# Patient Record
(demographics unavailable — no encounter records)

---

## 2025-07-01 NOTE — DISCUSSION/SUMMARY
[Normal Growth] : growth [Normal Development] : development  [No Elimination Concerns] : elimination [Continue Regimen] : feeding [No Skin Concerns] : skin [Normal Sleep Pattern] : sleep [None] : no medical problems [Anticipatory Guidance Given] : Anticipatory guidance addressed as per the history of present illness section [School] : school [Development and Mental Health] : development and mental health [Nutrition and Physical Activity] : nutrition and physical activity [Oral Health] : oral health [Safety] : safety [No Vaccines] : no vaccines needed [No Medications] : ~He/She~ is not on any medications [Patient] : patient [Mother] : mother [Full Activity without restrictions including Physical Education & Athletics] : Full Activity without restrictions including Physical Education & Athletics [I have examined the above-named student and completed the preparticipation physical evaluation. The athlete does not present apparent clinical contraindications to practice and participate in sport(s) as outlined above. A copy of the physical exam is on r] : I have examined the above-named student and completed the preparticipation physical evaluation. The athlete does not present apparent clinical contraindications to practice and participate in sport(s) as outlined above. A copy of the physical exam is on record in my office and can be made available to the school at the request of the parents. If conditions arise after the athlete has been cleared for participation, the physician may rescind the clearance until the problem is resolved and the potential consequences are completely explained to the athlete (and parents/guardians). [FreeTextEntry1] : Balanced nutrition, multivitamin supplementation and daily exercise emphasized. Annual PE/Dental/Eye. Advised to learn to eat more veggies and cut back on sweets/carbs

## 2025-07-01 NOTE — HISTORY OF PRESENT ILLNESS
[Mother] : mother [Fruit] : fruit [Meat] : meat [Grains] : grains [Eggs] : eggs [Fish] : fish [Dairy] : dairy [Normal] : Normal [Brushing teeth twice/d] : brushing teeth twice per day [Flossing teeth] : flossing teeth [Yes] : Patient goes to dentist yearly [Tap water] : Primary Fluoride Source: Tap water [Has Friends] : has friends [Grade ___] : Grade [unfilled] [No] : No cigarette smoke exposure [Up to date] : Up to date [NO] : No [FreeTextEntry7] : Healthy [de-identified] : none [de-identified] : vegetables are scarce; drinks Lactaid milk [FreeTextEntry1] : Well first visit for this 10 year old who is healthy. NKA. He eats well but almost no vegetables. He drinks water and milk. He sleeps well. He has regular BMs and voids. He is physically active but mom says he eats more than he walks.

## 2025-07-01 NOTE — PHYSICAL EXAM
[Alert] : alert [No Acute Distress] : no acute distress [Normocephalic] : normocephalic [Conjunctivae with no discharge] : conjunctivae with no discharge [PERRL] : PERRL [EOMI Bilateral] : EOMI bilateral [Auricles Well Formed] : auricles well formed [Nares Patent] : nares patent [Pink Nasal Mucosa] : pink nasal mucosa [Palate Intact] : palate intact [Nonerythematous Oropharynx] : nonerythematous oropharynx [Supple, full passive range of motion] : supple, full passive range of motion [No Palpable Masses] : no palpable masses [Symmetric Chest Rise] : symmetric chest rise [Clear to Auscultation Bilaterally] : clear to auscultation bilaterally [Regular Rate and Rhythm] : regular rate and rhythm [Normal S1, S2 present] : normal S1, S2 present [No Murmurs] : no murmurs [Soft] : soft [NonTender] : non tender [Non Distended] : non distended [Normoactive Bowel Sounds] : normoactive bowel sounds [No Hepatomegaly] : no hepatomegaly [No Splenomegaly] : no splenomegaly [Yossi: _____] : Yossi [unfilled] [Circumcised] : circumcised [Testicles Descended Bilaterally] : testicles descended bilaterally [No Abnormal Lymph Nodes Palpated] : no abnormal lymph nodes palpated [No Gait Asymmetry] : no gait asymmetry [No pain or deformities with palpation of bone, muscles, joints] : no pain or deformities with palpation of bone, muscles, joints [Normal Muscle Tone] : normal muscle tone [Straight] : straight [+2 Patella DTR] : +2 patella DTR [Cranial Nerves Grossly Intact] : cranial nerves grossly intact [No Rash or Lesions] : no rash or lesions [FreeTextEntry6] : some pubic hair growing [de-identified] : deferred

## 2025-07-22 NOTE — DISCUSSION/SUMMARY
[FreeTextEntry1] : Pataday 1 drop into right eye daily for the next 1-2 weeks. F/U if not resolving.

## 2025-07-22 NOTE — PHYSICAL EXAM
[Tenderness] : no tenderness [EOMI] : grossly EOMI [Conjuctival Injection] : conjunctival injection [Eyelid Swelling] : no eyelid swelling [Allergic Shiners] : allergic shiners [Clear] : right tympanic membrane clear [Pink Nasal Mucosa] : pink nasal mucosa [Erythematous Oropharynx] : nonerythematous oropharynx [Enlarged Tonsils] : tonsils not enlarged [Supple] : supple [FROM] : full passive range of motion [Symmetric Chest Wall] : symmetric chest wall [Clear to Auscultation Bilaterally] : clear to auscultation bilaterally [Regular Rate and Rhythm] : regular rate and rhythm [Normal S1, S2 audible] : normal S1, S2 audible [Murmur] : no murmur [NL] : no abnormal lymph nodes palpated

## 2025-07-22 NOTE — HISTORY OF PRESENT ILLNESS
[de-identified] : itchy reddened right eye [FreeTextEntry6] : Itchy reddened right eye for 1-2 days. He has been playing outside a lot.  No other symnptoms of illness. No eye discharge.

## 2025-07-22 NOTE — REVIEW OF SYSTEMS
[Eye Discharge] : no eye discharge [Eye Redness] : eye redness [Itchy Eyes] : itchy eyes [Negative] : Gastrointestinal